# Patient Record
Sex: MALE | Race: WHITE | Employment: FULL TIME | URBAN - METROPOLITAN AREA
[De-identification: names, ages, dates, MRNs, and addresses within clinical notes are randomized per-mention and may not be internally consistent; named-entity substitution may affect disease eponyms.]

---

## 2017-09-12 ENCOUNTER — OFFICE VISIT (OUTPATIENT)
Dept: FAMILY MEDICINE CLINIC | Age: 52
End: 2017-09-12
Payer: COMMERCIAL

## 2017-09-12 ENCOUNTER — HOSPITAL ENCOUNTER (OUTPATIENT)
Age: 52
Setting detail: SPECIMEN
Discharge: HOME | End: 2017-09-12
Payer: COMMERCIAL

## 2017-09-12 VITALS
WEIGHT: 213.6 LBS | BODY MASS INDEX: 28.93 KG/M2 | TEMPERATURE: 98.1 F | DIASTOLIC BLOOD PRESSURE: 82 MMHG | HEART RATE: 71 BPM | RESPIRATION RATE: 18 BRPM | SYSTOLIC BLOOD PRESSURE: 126 MMHG | HEIGHT: 72 IN | OXYGEN SATURATION: 98 %

## 2017-09-12 DIAGNOSIS — I10 ESSENTIAL HYPERTENSION: ICD-10-CM

## 2017-09-12 DIAGNOSIS — E11.42 TYPE 2 DIABETES MELLITUS WITH DIABETIC POLYNEUROPATHY, WITHOUT LONG-TERM CURRENT USE OF INSULIN (HCC): Primary | ICD-10-CM

## 2017-09-12 DIAGNOSIS — Z13.220 SCREENING FOR LIPOID DISORDERS: ICD-10-CM

## 2017-09-12 DIAGNOSIS — Z12.5 SCREENING FOR PROSTATE CANCER: ICD-10-CM

## 2017-09-12 LAB — HBA1C MFR BLD: 9 %

## 2017-09-12 PROCEDURE — 83036 HEMOGLOBIN GLYCOSYLATED A1C: CPT | Performed by: NURSE PRACTITIONER

## 2017-09-12 PROCEDURE — 99213 OFFICE O/P EST LOW 20 MIN: CPT | Performed by: NURSE PRACTITIONER

## 2017-09-12 RX ORDER — PREGABALIN 50 MG/1
50 CAPSULE ORAL 3 TIMES DAILY
Qty: 270 CAPSULE | Refills: 1 | Status: SHIPPED | OUTPATIENT
Start: 2017-09-12 | End: 2018-04-02 | Stop reason: SDUPTHER

## 2017-09-12 RX ORDER — ENALAPRIL MALEATE 2.5 MG/1
TABLET ORAL
Qty: 180 TABLET | Refills: 0 | Status: SHIPPED | OUTPATIENT
Start: 2017-09-12 | End: 2017-12-20 | Stop reason: SDUPTHER

## 2017-09-12 RX ORDER — LINAGLIPTIN 5 MG/1
TABLET, FILM COATED ORAL
Qty: 90 TABLET | Refills: 0 | Status: SHIPPED | OUTPATIENT
Start: 2017-09-12 | End: 2017-12-20 | Stop reason: SDUPTHER

## 2017-09-12 ASSESSMENT — PATIENT HEALTH QUESTIONNAIRE - PHQ9
1. LITTLE INTEREST OR PLEASURE IN DOING THINGS: 0
2. FEELING DOWN, DEPRESSED OR HOPELESS: 0
SUM OF ALL RESPONSES TO PHQ9 QUESTIONS 1 & 2: 0
SUM OF ALL RESPONSES TO PHQ QUESTIONS 1-9: 0

## 2017-09-14 LAB
CREATININE URINE: 148.2 MG/DL (ref 39–259)
MICROALBUMIN/CREAT 24H UR: <12 MG/L
MICROALBUMIN/CREAT UR-RTO: 8 MCG/MG CREAT

## 2017-09-23 ASSESSMENT — ENCOUNTER SYMPTOMS
WHEEZING: 0
ABDOMINAL PAIN: 0
COUGH: 0
EYE PAIN: 0
CONSTIPATION: 0
NAUSEA: 0
DIARRHEA: 0
SINUS PRESSURE: 0
ABDOMINAL DISTENTION: 0
SORE THROAT: 0
SHORTNESS OF BREATH: 0
BLOOD IN STOOL: 0
COLOR CHANGE: 0
CHEST TIGHTNESS: 0
EYE ITCHING: 0

## 2017-12-20 DIAGNOSIS — I10 ESSENTIAL HYPERTENSION: ICD-10-CM

## 2017-12-20 DIAGNOSIS — E11.42 TYPE 2 DIABETES MELLITUS WITH DIABETIC POLYNEUROPATHY, WITHOUT LONG-TERM CURRENT USE OF INSULIN (HCC): ICD-10-CM

## 2017-12-21 RX ORDER — LINAGLIPTIN 5 MG/1
TABLET, FILM COATED ORAL
Qty: 90 TABLET | Refills: 0 | Status: SHIPPED | OUTPATIENT
Start: 2017-12-21 | End: 2018-03-31 | Stop reason: ALTCHOICE

## 2017-12-21 RX ORDER — ENALAPRIL MALEATE 2.5 MG/1
TABLET ORAL
Qty: 180 TABLET | Refills: 0 | Status: SHIPPED | OUTPATIENT
Start: 2017-12-21 | End: 2018-03-23 | Stop reason: SDUPTHER

## 2018-03-23 ENCOUNTER — OFFICE VISIT (OUTPATIENT)
Dept: FAMILY MEDICINE CLINIC | Age: 53
End: 2018-03-23
Payer: COMMERCIAL

## 2018-03-23 VITALS
OXYGEN SATURATION: 98 % | BODY MASS INDEX: 29.17 KG/M2 | WEIGHT: 215.4 LBS | SYSTOLIC BLOOD PRESSURE: 118 MMHG | RESPIRATION RATE: 18 BRPM | DIASTOLIC BLOOD PRESSURE: 78 MMHG | HEART RATE: 87 BPM | TEMPERATURE: 98 F | HEIGHT: 72 IN

## 2018-03-23 DIAGNOSIS — E11.42 TYPE 2 DIABETES MELLITUS WITH DIABETIC POLYNEUROPATHY, WITHOUT LONG-TERM CURRENT USE OF INSULIN (HCC): ICD-10-CM

## 2018-03-23 DIAGNOSIS — I10 ESSENTIAL HYPERTENSION: ICD-10-CM

## 2018-03-23 LAB — HBA1C MFR BLD: 10.3 %

## 2018-03-23 PROCEDURE — 99213 OFFICE O/P EST LOW 20 MIN: CPT | Performed by: NURSE PRACTITIONER

## 2018-03-23 PROCEDURE — 83036 HEMOGLOBIN GLYCOSYLATED A1C: CPT | Performed by: NURSE PRACTITIONER

## 2018-03-23 RX ORDER — LINAGLIPTIN 5 MG/1
TABLET, FILM COATED ORAL
Qty: 90 TABLET | Refills: 0 | Status: CANCELLED | OUTPATIENT
Start: 2018-03-23

## 2018-03-23 RX ORDER — ENALAPRIL MALEATE 2.5 MG/1
TABLET ORAL
Qty: 180 TABLET | Refills: 1 | Status: SHIPPED | OUTPATIENT
Start: 2018-03-23 | End: 2018-10-02 | Stop reason: SDUPTHER

## 2018-03-23 ASSESSMENT — PATIENT HEALTH QUESTIONNAIRE - PHQ9
SUM OF ALL RESPONSES TO PHQ9 QUESTIONS 1 & 2: 0
1. LITTLE INTEREST OR PLEASURE IN DOING THINGS: 0
SUM OF ALL RESPONSES TO PHQ QUESTIONS 1-9: 0
2. FEELING DOWN, DEPRESSED OR HOPELESS: 0

## 2018-03-31 ASSESSMENT — ENCOUNTER SYMPTOMS
ABDOMINAL DISTENTION: 0
EYE ITCHING: 0
ABDOMINAL PAIN: 0
EYE PAIN: 0
DIARRHEA: 0
CONSTIPATION: 0
BLURRED VISION: 0
SHORTNESS OF BREATH: 0
SINUS PRESSURE: 0
SORE THROAT: 0
NAUSEA: 0
COLOR CHANGE: 0
WHEEZING: 0
COUGH: 0
CHEST TIGHTNESS: 0
BLOOD IN STOOL: 0

## 2018-03-31 NOTE — PROGRESS NOTES
Visit Information    Have you changed or started any medications since your last visit including any over-the-counter medicines, vitamins, or herbal medicines? no   Are you having any side effects from any of your medications? -  no  Have you stopped taking any of your medications? Is so, why? -  no    Have you seen any other physician or provider since your last visit? No  Have you had any other diagnostic tests since your last visit? No  Have you been seen in the emergency room and/or had an admission to a hospital since we last saw you? No  Have you had your routine dental cleaning in the past 6 months? no    Have you activated your [employer] account? If not, what are your barriers?  No: declined      Patient Care Team:  Gold Garcia CNP as PCP - General (Nurse Practitioner)  Gold Garcia CNP as PCP - [facility] Attributed Provider        Health Maintenance   Topic Date Due    Potassium monitoring  1965    Creatinine monitoring  1965    Diabetic foot exam  02/03/1975    Diabetic retinal exam  02/03/1975    DTaP/Tdap/Td vaccine (1 - Tdap) 02/03/1984    Pneumococcal med risk (1 of 1 - PPSV23) 02/03/1984    Lipid screen  09/11/2014    Shingles Vaccine (1 of 2 - 2 Dose Series) 02/03/2015    A1C test (Diabetic or Prediabetic)  12/12/2017    Flu vaccine (1) 11/10/2018 (Originally 9/1/2017)    Diabetic microalbuminuria test  09/12/2018    Colon cancer screen colonoscopy  06/01/2026    Hepatitis C screen  Addressed    HIV screen  Addressed
course has been stable. There are no hypoglycemic associated symptoms. Pertinent negatives for hypoglycemia include no headaches, nervousness/anxiousness or speech difficulty. There are no diabetic associated symptoms. Pertinent negatives for diabetes include no blurred vision, no chest pain, no polydipsia, no polyphagia, no polyuria and no weakness. There are no hypoglycemic complications. Symptoms are stable. Diabetic complications include peripheral neuropathy. Pertinent negatives for diabetic complications include no CVA. Risk factors for coronary artery disease include diabetes mellitus, male sex, hypertension and tobacco exposure. Current diabetic treatment includes diet and oral agent (dual therapy). He is compliant with treatment some of the time. His weight is stable. He is following a generally healthy diet. Meal planning includes avoidance of concentrated sweets. He has not had a previous visit with a dietitian. He participates in exercise intermittently. There is no change in his home blood glucose trend. An ACE inhibitor/angiotensin II receptor blocker is being taken. He does not see a podiatrist.Eye exam is not current. Hypertension   This is a chronic problem. The current episode started more than 1 year ago. The problem has been gradually improving since onset. The problem is controlled. Pertinent negatives include no blurred vision, chest pain, headaches, malaise/fatigue, palpitations, peripheral edema or shortness of breath. There are no associated agents to hypertension. Risk factors for coronary artery disease include diabetes mellitus, male gender and smoking/tobacco exposure. Past treatments include ACE inhibitors. The current treatment provides moderate improvement. There are no compliance problems. There is no history of kidney disease, CAD/MI, CVA or heart failure.        Hemoglobin A1C (%)   Date Value   03/23/2018 10.3   09/12/2017 9.0   12/23/2016 8.2             ( goal A1C is < 7)

## 2018-04-03 RX ORDER — PREGABALIN 50 MG/1
50 CAPSULE ORAL 3 TIMES DAILY
Qty: 270 CAPSULE | Refills: 1 | Status: SHIPPED | OUTPATIENT
Start: 2018-04-03 | End: 2018-10-02 | Stop reason: SDUPTHER

## 2018-04-04 ENCOUNTER — TELEPHONE (OUTPATIENT)
Dept: FAMILY MEDICINE CLINIC | Age: 53
End: 2018-04-04

## 2018-04-04 DIAGNOSIS — E11.40 TYPE 2 DIABETES MELLITUS WITH DIABETIC NEUROPATHY, WITHOUT LONG-TERM CURRENT USE OF INSULIN (HCC): Primary | ICD-10-CM

## 2018-07-02 ENCOUNTER — OFFICE VISIT (OUTPATIENT)
Dept: FAMILY MEDICINE CLINIC | Age: 53
End: 2018-07-02
Payer: COMMERCIAL

## 2018-07-02 VITALS
HEIGHT: 72 IN | OXYGEN SATURATION: 98 % | RESPIRATION RATE: 20 BRPM | WEIGHT: 220.8 LBS | DIASTOLIC BLOOD PRESSURE: 76 MMHG | TEMPERATURE: 98.4 F | HEART RATE: 76 BPM | SYSTOLIC BLOOD PRESSURE: 128 MMHG | BODY MASS INDEX: 29.91 KG/M2

## 2018-07-02 DIAGNOSIS — I10 ESSENTIAL HYPERTENSION: ICD-10-CM

## 2018-07-02 DIAGNOSIS — E11.40 TYPE 2 DIABETES MELLITUS WITH DIABETIC NEUROPATHY, WITHOUT LONG-TERM CURRENT USE OF INSULIN (HCC): Primary | ICD-10-CM

## 2018-07-02 LAB — HBA1C MFR BLD: 10.1 %

## 2018-07-02 PROCEDURE — 99214 OFFICE O/P EST MOD 30 MIN: CPT | Performed by: NURSE PRACTITIONER

## 2018-07-02 PROCEDURE — 83036 HEMOGLOBIN GLYCOSYLATED A1C: CPT | Performed by: NURSE PRACTITIONER

## 2018-07-02 ASSESSMENT — PATIENT HEALTH QUESTIONNAIRE - PHQ9
SUM OF ALL RESPONSES TO PHQ QUESTIONS 1-9: 0
2. FEELING DOWN, DEPRESSED OR HOPELESS: 0
1. LITTLE INTEREST OR PLEASURE IN DOING THINGS: 0
SUM OF ALL RESPONSES TO PHQ9 QUESTIONS 1 & 2: 0

## 2018-07-13 ASSESSMENT — ENCOUNTER SYMPTOMS
ABDOMINAL PAIN: 0
EYE ITCHING: 0
CONSTIPATION: 0
CHEST TIGHTNESS: 0
BLOOD IN STOOL: 0
WHEEZING: 0
EYE PAIN: 0
COLOR CHANGE: 0
NAUSEA: 0
SORE THROAT: 0
ABDOMINAL DISTENTION: 0
SHORTNESS OF BREATH: 0
SINUS PRESSURE: 0
DIARRHEA: 0
COUGH: 0

## 2018-07-14 NOTE — PROGRESS NOTES
[facility], [address] [address] [address] [address]  Dept: [phone]  Dept Fax: [phone]    Colt Staff is a 48 y.o. male who presents today for his medical conditions/complaints as noted below. Haleyjosepharias Staff is c/o of 3 Month Follow-Up (dm) and Medication Check    Kuldip George received counseling on the following healthy behaviors: nutrition, exercise and medication adherence  Reviewed prior labs and health maintenance  Continue current medications, diet and exercise. Discussed use, benefit, and side effects of prescribed medications. Barriers to medication compliance addressed. Patient given educational materials - see patient instructions  Was a self-tracking handout given in paper form or via [employer]? Yes    Requested Prescriptions     Signed Prescriptions Disp Refills    canagliflozin (INVOKANA) 300 MG TABS tablet 14 tablet 0     Sig: Take 1 tablet by mouth every morning (before breakfast) for 14 days       All patient questions answered. Patient voiced understanding. Quality Measures    Body mass index is 29.95 kg/m². Elevated. Weight control planned discussed Healthy diet and regular exercise. BP: 128/76 Blood pressure is normal. Treatment plan consists of No treatment change needed. Lab Results   Component Value Date    LDLCALC 87 09/11/2013    (goal LDL reduction with dx if diabetes is 50% LDL reduction)      PHQ Scores 7/2/2018 3/23/2018 9/12/2017   PHQ2 Score 0 0 0   PHQ9 Score 0 0 0     Interpretation of Total Score Depression Severity: 1-4 = Minimal depression, 5-9 = Mild depression, 10-14 = Moderate depression, 15-19 = Moderately severe depression, 20-27 = Severe depression        HPI:     Hypertension   This is a chronic problem. The current episode started more than 1 year ago. The problem has been gradually improving since onset. The problem is controlled.  Pertinent negatives include no chest pain, headaches, malaise/fatigue, palpitations, peripheral edema or shortness of breath. There are no associated agents to hypertension. Risk factors for coronary artery disease include diabetes mellitus, male gender and smoking/tobacco exposure. Past treatments include ACE inhibitors. The current treatment provides moderate improvement. There are no compliance problems. There is no history of kidney disease, CAD/MI, CVA or heart failure. Diabetes   He presents for his follow-up diabetic visit. He has type 2 diabetes mellitus. No MedicAlert identification noted. His disease course has been stable. There are no hypoglycemic associated symptoms. Pertinent negatives for hypoglycemia include no headaches, nervousness/anxiousness or speech difficulty. Associated symptoms include foot paresthesias. Pertinent negatives for diabetes include no chest pain, no polydipsia, no polyphagia, no polyuria and no weakness. There are no hypoglycemic complications. Symptoms are stable. Diabetic complications include peripheral neuropathy. Pertinent negatives for diabetic complications include no CVA. Risk factors for coronary artery disease include diabetes mellitus, hypertension, male sex and tobacco exposure. Current diabetic treatment includes oral agent (dual therapy). He is compliant with treatment some of the time. His weight is stable. He is following a generally healthy diet. Meal planning includes avoidance of concentrated sweets. He has not had a previous visit with a dietitian. He participates in exercise intermittently. His home blood glucose trend is increasing steadily. An ACE inhibitor/angiotensin II receptor blocker is being taken. He does not see a podiatrist.Eye exam is not current. States he knows he has been eating the wrong things and too much of them. Says he didn't think his A1c would be that elevated. Explained that we could add another medication and if it does not come down, he needs to think about insulin. Does not want to take insulin.     Hemoglobin A1C (%)   Date Value   07/02/2018 10.1   03/23/2018 10.3   09/12/2017 9.0             ( goal A1C is < 7)   Microalb/Crt. Ratio (mcg/mg creat)   Date Value   09/12/2017 8     LDL Calculated (mg/dL)   Date Value   09/11/2013 87       (goal LDL is <100)   No results found for: AST, ALT, BUN  BP Readings from Last 3 Encounters:   07/02/18 128/76   03/23/18 118/78   09/12/17 126/82          (goal 120/80)    Past Medical History:   Diagnosis Date    Hypertension     Neuropathy (HCC)     Type II or unspecified type diabetes mellitus without mention of complication, not stated as uncontrolled       Past Surgical History:   Procedure Laterality Date    CLEFT LIP REPAIR      LEG SURGERY Left        Family History   Problem Relation Age of Onset    High Blood Pressure Father     Diabetes Paternal Uncle     Diabetes Maternal Grandfather     Diabetes Maternal Cousin        Social History   Substance Use Topics    Smoking status: Current Every Day Smoker    Smokeless tobacco: Never Used      Comment: cigars    Alcohol use Yes      Current Outpatient Prescriptions   Medication Sig Dispense Refill    canagliflozin (INVOKANA) 300 MG TABS tablet Take 1 tablet by mouth every morning (before breakfast) for 14 days 14 tablet 0    SITagliptin (JANUVIA) 50 MG tablet Take 1 tablet by mouth daily 30 tablet 3    pregabalin (LYRICA) 50 MG capsule Take 1 capsule by mouth 3 times daily for 90 days. 270 capsule 1    enalapril (VASOTEC) 2.5 MG tablet TAKE 1 TABLET TWICE A  tablet 1    metFORMIN (GLUCOPHAGE) 1000 MG tablet TAKE 1 TABLET BY MOUTH TWO TIMES A DAY WITH MEALS 180 tablet 1     No current facility-administered medications for this visit.       Allergies   Allergen Reactions    Penicillins Hives     Childhood reaction       Health Maintenance   Topic Date Due    Potassium monitoring  1965    Creatinine monitoring  1965    Diabetic foot exam  02/03/1975    Diabetic retinal exam  02/03/1975    DTaP/Tdap/Td vaccine (1 - Tdap) 02/03/1984    Pneumococcal med risk (1 of 1 - PPSV23) 02/03/1984    Lipid screen  09/11/2014    Shingles Vaccine (1 of 2 - 2 Dose Series) 02/03/2015    Flu vaccine (1) 11/10/2018 (Originally 9/1/2018)    Diabetic microalbuminuria test  09/12/2018    A1C test (Diabetic or Prediabetic)  10/02/2018    Colon cancer screen colonoscopy  06/01/2026    Hepatitis C screen  Addressed    HIV screen  Addressed       Subjective:      Review of Systems   Constitutional: Negative for activity change, appetite change and malaise/fatigue. HENT: Negative for congestion, ear pain, hearing loss, sinus pressure, sore throat and tinnitus. Eyes: Negative for pain, itching and visual disturbance. Respiratory: Negative for cough, chest tightness, shortness of breath and wheezing. Cardiovascular: Negative for chest pain, palpitations and leg swelling. Gastrointestinal: Negative for abdominal distention, abdominal pain, blood in stool, constipation, diarrhea and nausea. Endocrine: Negative for cold intolerance, heat intolerance, polydipsia, polyphagia and polyuria. Genitourinary: Negative for dysuria, flank pain, frequency and urgency. Musculoskeletal: Negative for arthralgias, gait problem and myalgias. Skin: Negative for color change, rash and wound. Allergic/Immunologic: Negative for environmental allergies and food allergies. Neurological: Negative for speech difficulty, weakness, light-headedness and headaches. Hematological: Does not bruise/bleed easily. Psychiatric/Behavioral: Negative for decreased concentration and sleep disturbance. The patient is not nervous/anxious.         Objective:     /76 (Site: Left Arm, Position: Sitting, Cuff Size: Medium Adult)   Pulse 76   Temp 98.4 °F (36.9 °C) (Temporal)   Resp 20   Ht 6' (1.829 m)   Wt 220 lb 12.8 oz (100.2 kg)   SpO2 98%   BMI 29.95 kg/m²   Physical Exam   Constitutional: He is oriented to person, place, and time. He appears well-developed and well-nourished. HENT:   Head: Normocephalic. Right Ear: External ear normal.   Left Ear: External ear normal.   Nose: Nose normal.   Mouth/Throat: Oropharynx is clear and moist.   Eyes: Conjunctivae and EOM are normal.   Neck: Normal range of motion. Neck supple. No thyromegaly present. Cardiovascular: Normal rate, regular rhythm and normal heart sounds. Exam reveals no gallop and no friction rub. No murmur heard. Pulmonary/Chest: Effort normal and breath sounds normal. No respiratory distress. He has no wheezes. He has no rales. He exhibits no tenderness. Abdominal: Soft. Bowel sounds are normal. He exhibits no distension. There is no splenomegaly or hepatomegaly. There is no tenderness. No hernia. Musculoskeletal: Normal range of motion. He exhibits no edema or tenderness. Lymphadenopathy:     He has no cervical adenopathy. Neurological: He is alert and oriented to person, place, and time. Coordination and gait normal.   Skin: Skin is warm and dry. No rash noted. No erythema. Psychiatric: He has a normal mood and affect. His speech is normal and behavior is normal. Judgment and thought content normal. Cognition and memory are normal.   Nursing note and vitals reviewed. Assessment:      1. Type 2 diabetes mellitus with diabetic neuropathy, without long-term current use of insulin ([address]    2. Essential hypertension                     Plan:     Diabetes:  Monitor BS daily or more often if BS's are out of range  Keep log of BS results and bring with you at next appointment  Start Invokana as directed.   Take medications as directed  Medications:  canagliflozin, sitagliptin and metformin  Discussed diet, eat balanced meals with a variety of food, limit intake of carbohydrates, low fat, high fiber  Read nutrition labels so appropriate carbohydrates can be counted  Lose weight slowly if needed  Monitor BP every couple of days  Discussed A1c reading today

## 2018-08-06 DIAGNOSIS — E11.40 TYPE 2 DIABETES MELLITUS WITH DIABETIC NEUROPATHY, WITHOUT LONG-TERM CURRENT USE OF INSULIN (HCC): ICD-10-CM

## 2018-09-21 DIAGNOSIS — E11.42 TYPE 2 DIABETES MELLITUS WITH DIABETIC POLYNEUROPATHY, WITHOUT LONG-TERM CURRENT USE OF INSULIN (HCC): ICD-10-CM

## 2018-10-02 ENCOUNTER — OFFICE VISIT (OUTPATIENT)
Dept: FAMILY MEDICINE CLINIC | Age: 53
End: 2018-10-02
Payer: COMMERCIAL

## 2018-10-02 ENCOUNTER — HOSPITAL ENCOUNTER (OUTPATIENT)
Age: 53
Setting detail: SPECIMEN
Discharge: HOME | End: 2018-10-02
Payer: COMMERCIAL

## 2018-10-02 VITALS
RESPIRATION RATE: 18 BRPM | TEMPERATURE: 98 F | HEIGHT: 72 IN | OXYGEN SATURATION: 97 % | HEART RATE: 73 BPM | WEIGHT: 215.6 LBS | DIASTOLIC BLOOD PRESSURE: 82 MMHG | BODY MASS INDEX: 29.2 KG/M2 | SYSTOLIC BLOOD PRESSURE: 120 MMHG

## 2018-10-02 DIAGNOSIS — E11.40 TYPE 2 DIABETES MELLITUS WITH DIABETIC NEUROPATHY, WITHOUT LONG-TERM CURRENT USE OF INSULIN (HCC): Primary | ICD-10-CM

## 2018-10-02 DIAGNOSIS — I10 ESSENTIAL HYPERTENSION: ICD-10-CM

## 2018-10-02 DIAGNOSIS — R41.3 MEMORY LOSS, SHORT TERM: ICD-10-CM

## 2018-10-02 LAB — HBA1C MFR BLD: 10.9 %

## 2018-10-02 PROCEDURE — 99213 OFFICE O/P EST LOW 20 MIN: CPT | Performed by: NURSE PRACTITIONER

## 2018-10-02 PROCEDURE — 83036 HEMOGLOBIN GLYCOSYLATED A1C: CPT | Performed by: NURSE PRACTITIONER

## 2018-10-02 RX ORDER — DONEPEZIL HYDROCHLORIDE 10 MG/1
10 TABLET, FILM COATED ORAL NIGHTLY
Qty: 90 TABLET | Refills: 1 | Status: SHIPPED | OUTPATIENT
Start: 2018-10-02 | End: 2020-08-05 | Stop reason: ALTCHOICE

## 2018-10-02 RX ORDER — PREGABALIN 50 MG/1
50 CAPSULE ORAL 3 TIMES DAILY
Qty: 270 CAPSULE | Refills: 1 | Status: SHIPPED | OUTPATIENT
Start: 2018-10-02 | End: 2020-09-04

## 2018-10-02 RX ORDER — ENALAPRIL MALEATE 2.5 MG/1
TABLET ORAL
Qty: 180 TABLET | Refills: 1 | Status: SHIPPED | OUTPATIENT
Start: 2018-10-02 | End: 2020-08-05 | Stop reason: SDUPTHER

## 2018-10-02 ASSESSMENT — PATIENT HEALTH QUESTIONNAIRE - PHQ9
1. LITTLE INTEREST OR PLEASURE IN DOING THINGS: 0
SUM OF ALL RESPONSES TO PHQ QUESTIONS 1-9: 0
SUM OF ALL RESPONSES TO PHQ QUESTIONS 1-9: 0
2. FEELING DOWN, DEPRESSED OR HOPELESS: 0
SUM OF ALL RESPONSES TO PHQ9 QUESTIONS 1 & 2: 0

## 2018-10-02 NOTE — PROGRESS NOTES
Visit Information    Have you changed or started any medications since your last visit including any over-the-counter medicines, vitamins, or herbal medicines? no   Have you stopped taking any of your medications? Is so, why? -  no  Are you having any side effects from any of your medications? - no    Have you seen any other physician or provider since your last visit?  no   Have you had any other diagnostic tests since your last visit?  no   Have you been seen in the emergency room and/or had an admission in a hospital since we last saw you?  no   Have you had your routine dental cleaning in the past 6 months?  yes      Do you have an active MyChart account? If no, what is the barrier?   Yes    Patient Care Team:  RAGINI Neely CNP as PCP - General (Nurse Practitioner)  RAGINI Neely CNP as PCP - [facility] Attributed Provider        Health Maintenance   Topic Date Due    Potassium monitoring  1965    Creatinine monitoring  1965    Diabetic foot exam  02/03/1975    Diabetic retinal exam  02/03/1975    DTaP/Tdap/Td vaccine (1 - Tdap) 02/03/1984    Pneumococcal med risk (1 of 1 - PPSV23) 02/03/1984    Lipid screen  09/11/2014    Shingles Vaccine (1 of 2 - 2 Dose Series) 02/03/2015    Diabetic microalbuminuria test  09/12/2018    A1C test (Diabetic or Prediabetic)  10/02/2018    Flu vaccine (1) 11/10/2018 (Originally 9/1/2018)    Colon cancer screen colonoscopy  06/01/2026    Hepatitis C screen  Addressed    HIV screen  Addressed
problems. There is no history of kidney disease, CAD/MI, CVA or heart failure. Hemoglobin A1C (%)   Date Value   07/02/2018 10.1   03/23/2018 10.3   09/12/2017 9.0             ( goal A1C is < 7)   Microalb/Crt. Ratio (mcg/mg creat)   Date Value   09/12/2017 8     LDL Calculated (mg/dL)   Date Value   09/11/2013 87       (goal LDL is <100)   No results found for: AST, ALT, BUN  BP Readings from Last 3 Encounters:   10/02/18 120/82   07/02/18 128/76   03/23/18 118/78          (goal 120/80)    Past Medical History:   Diagnosis Date    Hypertension     Neuropathy     Type II or unspecified type diabetes mellitus without mention of complication, not stated as uncontrolled       Past Surgical History:   Procedure Laterality Date    CLEFT LIP REPAIR      LEG SURGERY Left        Family History   Problem Relation Age of Onset    High Blood Pressure Father     Diabetes Paternal Uncle     Diabetes Maternal Grandfather     Diabetes Maternal Cousin        Social History   Substance Use Topics    Smoking status: Current Every Day Smoker    Smokeless tobacco: Never Used      Comment: cigars    Alcohol use Yes      Current Outpatient Prescriptions   Medication Sig Dispense Refill    metFORMIN (GLUCOPHAGE) 1000 MG tablet TAKE 1 TABLET BY MOUTH TWO TIMES A DAY WITH MEALS 180 tablet 1    enalapril (VASOTEC) 2.5 MG tablet TAKE 1 TABLET TWICE A  tablet 1    pregabalin (LYRICA) 50 MG capsule Take 1 capsule by mouth 3 times daily for 90 days. 270 capsule 1     No current facility-administered medications for this visit.       Allergies   Allergen Reactions    Penicillins Hives     Childhood reaction       Health Maintenance   Topic Date Due    Potassium monitoring  1965    Creatinine monitoring  1965    Diabetic foot exam  02/03/1975    Diabetic retinal exam  02/03/1975    DTaP/Tdap/Td vaccine (1 - Tdap) 02/03/1984    Pneumococcal med risk (1 of 1 - PPSV23) 02/03/1984    Lipid screen

## 2018-10-03 LAB
CREATININE URINE: 107.1 MG/DL (ref 39–259)
MICROALBUMIN/CREAT 24H UR: <12 MG/L
MICROALBUMIN/CREAT UR-RTO: NORMAL MCG/MG CREAT

## 2018-10-07 ASSESSMENT — ENCOUNTER SYMPTOMS
CONSTIPATION: 0
COUGH: 0
DIARRHEA: 0
COLOR CHANGE: 0
ABDOMINAL PAIN: 0
ABDOMINAL DISTENTION: 0
CHEST TIGHTNESS: 0
NAUSEA: 0
SORE THROAT: 0
WHEEZING: 0
SINUS PRESSURE: 0
EYE PAIN: 0
EYE ITCHING: 0
SHORTNESS OF BREATH: 0
BLOOD IN STOOL: 0

## 2018-11-13 ENCOUNTER — TELEPHONE (OUTPATIENT)
Dept: FAMILY MEDICINE CLINIC | Age: 53
End: 2018-11-13

## 2018-11-26 ENCOUNTER — TELEPHONE (OUTPATIENT)
Dept: FAMILY MEDICINE CLINIC | Age: 53
End: 2018-11-26

## 2018-11-30 ENCOUNTER — TELEPHONE (OUTPATIENT)
Dept: FAMILY MEDICINE CLINIC | Age: 53
End: 2018-11-30

## 2020-09-03 ENCOUNTER — HOSPITAL ENCOUNTER (OUTPATIENT)
Dept: LAB | Age: 55
Discharge: HOME | End: 2020-09-03
Payer: COMMERCIAL

## 2020-09-03 LAB
ALBUMIN SERPL-MCNC: 4.8 G/DL (ref 3.5–5.2)
ALBUMIN/GLOBULIN RATIO: 2 (ref 1–2.5)
ALP BLD-CCNC: 80 U/L (ref 40–129)
ALT SERPL-CCNC: 21 U/L (ref 5–41)
ANION GAP SERPL CALCULATED.3IONS-SCNC: 10 MMOL/L (ref 9–17)
AST SERPL-CCNC: 15 U/L
BILIRUB SERPL-MCNC: 0.38 MG/DL (ref 0.3–1.2)
BUN BLDV-MCNC: 12 MG/DL (ref 6–20)
BUN/CREAT BLD: 14 (ref 9–20)
CALCIUM SERPL-MCNC: 10 MG/DL (ref 8.6–10.4)
CHLORIDE BLD-SCNC: 101 MMOL/L (ref 98–107)
CHOLESTEROL/HDL RATIO: 2.2
CHOLESTEROL: 112 MG/DL
CO2: 28 MMOL/L (ref 20–31)
CREAT SERPL-MCNC: 0.88 MG/DL (ref 0.7–1.2)
ESTIMATED AVERAGE GLUCOSE: 269 MG/DL
GFR AFRICAN AMERICAN: >60 ML/MIN
GFR NON-AFRICAN AMERICAN: >60 ML/MIN
GFR SERPL CREATININE-BSD FRML MDRD: ABNORMAL ML/MIN/{1.73_M2}
GFR SERPL CREATININE-BSD FRML MDRD: ABNORMAL ML/MIN/{1.73_M2}
GLUCOSE BLD-MCNC: 162 MG/DL (ref 70–99)
HBA1C MFR BLD: 11 % (ref 4–6)
HCT VFR BLD CALC: 45.3 % (ref 40.7–50.3)
HDLC SERPL-MCNC: 50 MG/DL
HEMOGLOBIN: 14.4 G/DL (ref 13–17)
LDL CHOLESTEROL: 48 MG/DL (ref 0–130)
MCH RBC QN AUTO: 29.5 PG (ref 25.2–33.5)
MCHC RBC AUTO-ENTMCNC: 31.8 G/DL (ref 28.4–34.8)
MCV RBC AUTO: 92.8 FL (ref 82.6–102.9)
NRBC AUTOMATED: 0 PER 100 WBC
PDW BLD-RTO: 12.6 % (ref 11.8–14.4)
PLATELET # BLD: 239 K/UL (ref 138–453)
PMV BLD AUTO: 9.8 FL (ref 8.1–13.5)
POTASSIUM SERPL-SCNC: 4.5 MMOL/L (ref 3.7–5.3)
PROSTATE SPECIFIC ANTIGEN: 0.41 UG/L
RBC # BLD: 4.88 M/UL (ref 4.21–5.77)
SODIUM BLD-SCNC: 139 MMOL/L (ref 135–144)
TOTAL PROTEIN: 7.2 G/DL (ref 6.4–8.3)
TRIGL SERPL-MCNC: 71 MG/DL
VLDLC SERPL CALC-MCNC: NORMAL MG/DL (ref 1–30)
WBC # BLD: 11.1 K/UL (ref 3.5–11.3)

## 2020-09-03 PROCEDURE — 80061 LIPID PANEL: CPT

## 2020-09-03 PROCEDURE — 85027 COMPLETE CBC AUTOMATED: CPT

## 2020-09-03 PROCEDURE — 80053 COMPREHEN METABOLIC PANEL: CPT

## 2020-09-03 PROCEDURE — G0103 PSA SCREENING: HCPCS

## 2020-09-03 PROCEDURE — 83036 HEMOGLOBIN GLYCOSYLATED A1C: CPT

## 2020-09-03 PROCEDURE — 36415 COLL VENOUS BLD VENIPUNCTURE: CPT

## 2020-09-04 PROBLEM — Z86.79 H/O CARDIAC MURMUR: Status: ACTIVE | Noted: 2019-10-02

## 2022-02-22 ENCOUNTER — HOSPITAL ENCOUNTER (OUTPATIENT)
Dept: NUTRITION | Age: 57
Discharge: HOME | End: 2022-02-22

## 2022-02-22 NOTE — PROGRESS NOTES
This is a notice of failure to show for a medical nutrition therapy appointment. Kinga Aponte had an appointment with the dietitian this date, but did not show, cancel or reschedule before the appointment time. If an appointment is still desired, please contact [facility] scheduling [phone].     Thank you for the referral.    Electronically signed by Tiago Santos RD, LD on 2/22/2022 at 1:21 PM

## 2022-10-03 ENCOUNTER — HOSPITAL ENCOUNTER (OUTPATIENT)
Dept: MRI IMAGING | Age: 57
Discharge: HOME | End: 2022-10-05
Payer: COMMERCIAL

## 2022-10-03 ENCOUNTER — HOSPITAL ENCOUNTER (OUTPATIENT)
Age: 57
Discharge: HOME | End: 2022-10-03
Payer: COMMERCIAL

## 2022-10-03 DIAGNOSIS — H91.8X9 OTHER SPECIFIED FORMS OF HEARING LOSS, UNSPECIFIED LATERALITY: ICD-10-CM

## 2022-10-03 DIAGNOSIS — H92.01 OTALGIA OF RIGHT EAR: ICD-10-CM

## 2022-10-03 DIAGNOSIS — H93.293 OTHER ABNORMAL AUDITORY PERCEPTIONS, BILATERAL: ICD-10-CM

## 2022-10-03 LAB
BUN BLDV-MCNC: 24 MG/DL (ref 6–20)
CREAT SERPL-MCNC: 0.97 MG/DL (ref 0.7–1.2)
GFR AFRICAN AMERICAN: >60 ML/MIN
GFR NON-AFRICAN AMERICAN: >60 ML/MIN
GFR SERPL CREATININE-BSD FRML MDRD: ABNORMAL ML/MIN/{1.73_M2}
GFR SERPL CREATININE-BSD FRML MDRD: ABNORMAL ML/MIN/{1.73_M2}

## 2022-10-03 PROCEDURE — 36415 COLL VENOUS BLD VENIPUNCTURE: CPT

## 2022-10-03 PROCEDURE — 82565 ASSAY OF CREATININE: CPT

## 2022-10-03 PROCEDURE — 70553 MRI BRAIN STEM W/O & W/DYE: CPT

## 2022-10-03 PROCEDURE — 84520 ASSAY OF UREA NITROGEN: CPT

## 2022-10-03 PROCEDURE — A9579 GAD-BASE MR CONTRAST NOS,1ML: HCPCS | Performed by: OTOLARYNGOLOGY

## 2022-10-03 PROCEDURE — 6360000004 HC RX CONTRAST MEDICATION: Performed by: OTOLARYNGOLOGY

## 2022-10-03 RX ADMIN — GADOTERIDOL 15 ML: 279.3 INJECTION, SOLUTION INTRAVENOUS at 16:56
